# Patient Record
Sex: MALE | Race: OTHER | URBAN - METROPOLITAN AREA
[De-identification: names, ages, dates, MRNs, and addresses within clinical notes are randomized per-mention and may not be internally consistent; named-entity substitution may affect disease eponyms.]

---

## 2018-02-27 ENCOUNTER — OUTPATIENT (OUTPATIENT)
Dept: OUTPATIENT SERVICES | Facility: HOSPITAL | Age: 78
LOS: 1 days | Discharge: HOME | End: 2018-02-27

## 2018-02-27 VITALS
HEIGHT: 68 IN | TEMPERATURE: 96 F | SYSTOLIC BLOOD PRESSURE: 109 MMHG | DIASTOLIC BLOOD PRESSURE: 74 MMHG | RESPIRATION RATE: 18 BRPM | WEIGHT: 184.97 LBS | OXYGEN SATURATION: 95 % | HEART RATE: 70 BPM

## 2018-02-27 VITALS — HEART RATE: 62 BPM | RESPIRATION RATE: 17 BRPM | DIASTOLIC BLOOD PRESSURE: 71 MMHG | SYSTOLIC BLOOD PRESSURE: 103 MMHG

## 2018-02-27 DIAGNOSIS — Z95.811 PRESENCE OF HEART ASSIST DEVICE: Chronic | ICD-10-CM

## 2018-02-27 DIAGNOSIS — Z95.1 PRESENCE OF AORTOCORONARY BYPASS GRAFT: Chronic | ICD-10-CM

## 2018-02-27 DIAGNOSIS — Z95.0 PRESENCE OF CARDIAC PACEMAKER: Chronic | ICD-10-CM

## 2018-02-27 DIAGNOSIS — Z95.810 PRESENCE OF AUTOMATIC (IMPLANTABLE) CARDIAC DEFIBRILLATOR: Chronic | ICD-10-CM

## 2018-02-27 NOTE — ASU PATIENT PROFILE, ADULT - PMH
Benign prostate hyperplasia    Cardiomyopathy    Chronic kidney disease    Chronic systolic (congestive) heart failure    Hypercholesteremia    Hypertension    Myocardial infarction    Pneumothorax

## 2018-02-27 NOTE — PRE-ANESTHESIA EVALUATION ADULT - NSANTHOSAYNRD_GEN_A_CORE
No. FELA screening performed.  STOP BANG Legend: 0-2 = LOW Risk; 3-4 = INTERMEDIATE Risk; 5-8 = HIGH Risk

## 2018-02-27 NOTE — ASU PATIENT PROFILE, ADULT - PSH
AICD (automatic cardioverter/defibrillator) present    Cardiac pacemaker    S/P CABG x 5    S/P ventricular assist device

## 2018-03-06 DIAGNOSIS — H26.9 UNSPECIFIED CATARACT: ICD-10-CM

## 2018-03-20 ENCOUNTER — OUTPATIENT (OUTPATIENT)
Dept: OUTPATIENT SERVICES | Facility: HOSPITAL | Age: 78
LOS: 1 days | Discharge: HOME | End: 2018-03-20

## 2018-03-20 VITALS — DIASTOLIC BLOOD PRESSURE: 64 MMHG | RESPIRATION RATE: 19 BRPM | HEART RATE: 66 BPM | SYSTOLIC BLOOD PRESSURE: 101 MMHG

## 2018-03-20 VITALS
TEMPERATURE: 97 F | HEIGHT: 70 IN | WEIGHT: 181.22 LBS | HEART RATE: 72 BPM | RESPIRATION RATE: 17 BRPM | DIASTOLIC BLOOD PRESSURE: 43 MMHG | SYSTOLIC BLOOD PRESSURE: 106 MMHG | OXYGEN SATURATION: 95 %

## 2018-03-20 DIAGNOSIS — Z95.811 PRESENCE OF HEART ASSIST DEVICE: Chronic | ICD-10-CM

## 2018-03-20 DIAGNOSIS — Z95.1 PRESENCE OF AORTOCORONARY BYPASS GRAFT: Chronic | ICD-10-CM

## 2018-03-20 DIAGNOSIS — Z95.810 PRESENCE OF AUTOMATIC (IMPLANTABLE) CARDIAC DEFIBRILLATOR: Chronic | ICD-10-CM

## 2018-03-20 DIAGNOSIS — Z95.0 PRESENCE OF CARDIAC PACEMAKER: Chronic | ICD-10-CM

## 2018-03-20 RX ORDER — TAMSULOSIN HYDROCHLORIDE 0.4 MG/1
1 CAPSULE ORAL
Qty: 0 | Refills: 0 | COMMUNITY

## 2018-03-20 RX ORDER — SENNA PLUS 8.6 MG/1
0 TABLET ORAL
Qty: 0 | Refills: 0 | COMMUNITY

## 2018-03-20 RX ORDER — AMIODARONE HYDROCHLORIDE 400 MG/1
0 TABLET ORAL
Qty: 0 | Refills: 0 | COMMUNITY

## 2018-03-20 RX ORDER — BUMETANIDE 0.25 MG/ML
0 INJECTION INTRAMUSCULAR; INTRAVENOUS
Qty: 0 | Refills: 0 | COMMUNITY

## 2018-03-20 RX ORDER — HYDRALAZINE HCL 50 MG
50 TABLET ORAL
Qty: 0 | Refills: 0 | COMMUNITY

## 2018-03-20 RX ORDER — EPLERENONE 50 MG/1
1 TABLET, FILM COATED ORAL
Qty: 0 | Refills: 0 | COMMUNITY

## 2018-03-20 RX ORDER — WARFARIN SODIUM 2.5 MG/1
1 TABLET ORAL
Qty: 0 | Refills: 0 | COMMUNITY

## 2018-03-20 RX ORDER — FERROUS SULFATE 325(65) MG
0 TABLET ORAL
Qty: 0 | Refills: 0 | COMMUNITY

## 2018-03-20 RX ORDER — DOCUSATE SODIUM 100 MG
0 CAPSULE ORAL
Qty: 0 | Refills: 0 | COMMUNITY

## 2018-03-20 RX ORDER — ASPIRIN/CALCIUM CARB/MAGNESIUM 324 MG
1 TABLET ORAL
Qty: 0 | Refills: 0 | COMMUNITY

## 2018-03-20 RX ORDER — OMEPRAZOLE 10 MG/1
1 CAPSULE, DELAYED RELEASE ORAL
Qty: 0 | Refills: 0 | COMMUNITY

## 2018-03-20 RX ORDER — POTASSIUM CHLORIDE 20 MEQ
1 PACKET (EA) ORAL
Qty: 0 | Refills: 0 | COMMUNITY

## 2018-03-20 NOTE — CHART NOTE - NSCHARTNOTEFT_GEN_A_CORE
PACU ANESTHESIA ADMISSION NOTE      Procedure:   Post op diagnosis:      ____  Intubated  TV:______       Rate: ______      FiO2: ______    __x__  Patent Airway    ___x_  Full return of protective reflexes    __x__  Full recovery from anesthesia / back to baseline     Vitals:   T:           R: 19                 BP: 113/74                 Sat: 99                  P: 70      Mental Status:  _x___ Awake   __x___ Alert   _____ Drowsy   _____ Sedated    Nausea/Vomiting:  _x___ NO  ______Yes,   See Post - Op Orders          Pain Scale (0-10):  __0___    Treatment: ____ None    ____ See Post - Op/PCA Orders    Post - Operative Fluids:   ____ Oral   ____ See Post - Op Orders    Plan: Discharge:   _x___Home       _____Floor     _____Critical Care    _____  Other:_________________    Comments:

## 2018-03-23 DIAGNOSIS — H26.8 OTHER SPECIFIED CATARACT: ICD-10-CM

## 2021-07-24 NOTE — PRE-ANESTHESIA EVALUATION ADULT - ANESTHESIA, PREVIOUS REACTION, PROFILE
none Patient complaining of sharp right sided chest pain that started one week ago. Patient also complaining of difficulty breathing